# Patient Record
Sex: FEMALE | URBAN - NONMETROPOLITAN AREA
[De-identification: names, ages, dates, MRNs, and addresses within clinical notes are randomized per-mention and may not be internally consistent; named-entity substitution may affect disease eponyms.]

---

## 2017-12-20 ENCOUNTER — NEW PATIENT (OUTPATIENT)
Dept: URBAN - NONMETROPOLITAN AREA CLINIC 7 | Facility: CLINIC | Age: 57
End: 2017-12-20
Payer: COMMERCIAL

## 2017-12-20 DIAGNOSIS — H35.412 LATTICE DEGENERATION OF RETINA, LEFT EYE: ICD-10-CM

## 2017-12-20 PROCEDURE — 99204 OFFICE O/P NEW MOD 45 MIN: CPT | Performed by: OPTOMETRIST

## 2017-12-20 PROCEDURE — 92250 FUNDUS PHOTOGRAPHY W/I&R: CPT | Performed by: OPTOMETRIST

## 2017-12-20 ASSESSMENT — INTRAOCULAR PRESSURE
OS: 19
OD: 18

## 2017-12-20 ASSESSMENT — VISUAL ACUITY
OS: 20/20
OD: 20/20

## 2018-09-06 ENCOUNTER — FOLLOW UP ESTABLISHED (OUTPATIENT)
Dept: URBAN - METROPOLITAN AREA CLINIC 60 | Facility: CLINIC | Age: 58
End: 2018-09-06
Payer: COMMERCIAL

## 2018-09-06 DIAGNOSIS — H25.13 AGE-RELATED NUCLEAR CATARACT, BILATERAL: ICD-10-CM

## 2018-09-06 PROCEDURE — 99213 OFFICE O/P EST LOW 20 MIN: CPT | Performed by: OPTOMETRIST

## 2018-09-06 PROCEDURE — 92133 CPTRZD OPH DX IMG PST SGM ON: CPT | Performed by: OPTOMETRIST

## 2018-09-06 PROCEDURE — 92083 EXTENDED VISUAL FIELD XM: CPT | Performed by: OPTOMETRIST

## 2018-09-06 ASSESSMENT — INTRAOCULAR PRESSURE
OS: 16
OD: 16

## 2018-09-06 ASSESSMENT — VISUAL ACUITY
OS: 20/20
OD: 20/20

## 2019-02-01 ENCOUNTER — FOLLOW UP ESTABLISHED (OUTPATIENT)
Dept: URBAN - NONMETROPOLITAN AREA CLINIC 7 | Facility: CLINIC | Age: 59
End: 2019-02-01
Payer: COMMERCIAL

## 2019-02-01 DIAGNOSIS — H57.052 TONIC PUPIL, LEFT EYE: ICD-10-CM

## 2019-02-01 PROCEDURE — 99213 OFFICE O/P EST LOW 20 MIN: CPT | Performed by: OPTOMETRIST

## 2019-02-01 ASSESSMENT — INTRAOCULAR PRESSURE
OS: 18
OD: 17

## 2019-05-03 ENCOUNTER — FOLLOW UP ESTABLISHED (OUTPATIENT)
Dept: URBAN - NONMETROPOLITAN AREA CLINIC 7 | Facility: CLINIC | Age: 59
End: 2019-05-03
Payer: COMMERCIAL

## 2019-05-03 DIAGNOSIS — H04.123 TEAR FILM INSUFFICIENCY OF BILATERAL LACRIMAL GLANDS: ICD-10-CM

## 2019-05-03 DIAGNOSIS — H40.013 OPEN ANGLE WITH BORDERLINE FINDINGS, LOW RISK, BILATERAL: ICD-10-CM

## 2019-05-03 PROCEDURE — 92250 FUNDUS PHOTOGRAPHY W/I&R: CPT | Performed by: OPTOMETRIST

## 2019-05-03 PROCEDURE — 92014 COMPRE OPH EXAM EST PT 1/>: CPT | Performed by: OPTOMETRIST

## 2019-05-03 ASSESSMENT — VISUAL ACUITY
OS: 20/20
OD: 20/20

## 2019-05-03 ASSESSMENT — INTRAOCULAR PRESSURE
OD: 19
OS: 19

## 2019-12-16 ENCOUNTER — FOLLOW UP ESTABLISHED (OUTPATIENT)
Dept: URBAN - METROPOLITAN AREA CLINIC 60 | Facility: CLINIC | Age: 59
End: 2019-12-16
Payer: COMMERCIAL

## 2019-12-16 DIAGNOSIS — H15.102 UNSPECIFIED EPISCLERITIS, LEFT EYE: Primary | ICD-10-CM

## 2019-12-16 DIAGNOSIS — H11.152 PINGUECULA, LEFT EYE: ICD-10-CM

## 2019-12-16 PROCEDURE — 92012 INTRM OPH EXAM EST PATIENT: CPT | Performed by: OPTOMETRIST

## 2019-12-16 RX ORDER — PREDNISOLONE ACETATE 10 MG/ML
1 % SUSPENSION/ DROPS OPHTHALMIC
Qty: 1 | Refills: 0 | Status: INACTIVE
Start: 2019-12-16 | End: 2020-10-13

## 2020-10-13 ENCOUNTER — FOLLOW UP ESTABLISHED (OUTPATIENT)
Dept: URBAN - METROPOLITAN AREA CLINIC 60 | Facility: CLINIC | Age: 60
End: 2020-10-13
Payer: COMMERCIAL

## 2020-10-13 DIAGNOSIS — H15.101 UNSPECIFIED EPISCLERITIS, RIGHT EYE: Primary | ICD-10-CM

## 2020-10-13 PROCEDURE — 92012 INTRM OPH EXAM EST PATIENT: CPT | Performed by: OPTOMETRIST

## 2020-10-13 RX ORDER — PREDNISOLONE ACETATE 10 MG/ML
1 % SUSPENSION/ DROPS OPHTHALMIC
Qty: 1 | Refills: 0 | Status: ACTIVE
Start: 2020-10-13

## 2020-10-13 ASSESSMENT — INTRAOCULAR PRESSURE
OD: 18
OS: 20

## 2022-04-15 ENCOUNTER — OFFICE VISIT (OUTPATIENT)
Dept: URBAN - METROPOLITAN AREA CLINIC 60 | Facility: CLINIC | Age: 62
End: 2022-04-15
Payer: MEDICARE

## 2022-04-15 DIAGNOSIS — H52.4 PRESBYOPIA: ICD-10-CM

## 2022-04-15 DIAGNOSIS — H40.013 OPEN ANGLE WITH BORDERLINE FINDINGS, LOW RISK, BILATERAL: ICD-10-CM

## 2022-04-15 DIAGNOSIS — H25.13 AGE-RELATED NUCLEAR CATARACT, BILATERAL: Primary | ICD-10-CM

## 2022-04-15 DIAGNOSIS — H15.109 EPISCLERITIS OF EYE: ICD-10-CM

## 2022-04-15 PROCEDURE — 92133 CPTRZD OPH DX IMG PST SGM ON: CPT | Performed by: OPTOMETRIST

## 2022-04-15 PROCEDURE — 92134 CPTRZ OPH DX IMG PST SGM RTA: CPT | Performed by: OPTOMETRIST

## 2022-04-15 PROCEDURE — 99214 OFFICE O/P EST MOD 30 MIN: CPT | Performed by: OPTOMETRIST

## 2022-04-15 PROCEDURE — 76514 ECHO EXAM OF EYE THICKNESS: CPT | Performed by: OPTOMETRIST

## 2022-04-15 RX ORDER — PREDNISOLONE ACETATE 10 MG/ML
1 % SUSPENSION/ DROPS OPHTHALMIC
Qty: 10 | Refills: 0 | Status: ACTIVE
Start: 2022-04-15

## 2022-04-15 NOTE — IMPRESSION/PLAN
Impression: Open angle with borderline findings, low risk, bilateral
*Optic Cupping *no family history Plan: Patient educated. Pachs OCT RNFL and macular GCC done today to monitor status for progression. Results reviewed with the patient. No treatment. Will continue to monitor.

## 2022-04-15 NOTE — IMPRESSION/PLAN
Impression: Episcleritis of eye: H15.109. Bilateral.
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 Recommend patient follow with PCP to rule out any systemic or auto immune disorder.  
Will start patient on Prednisolone QID OU (erxd) x 1 week when symptomatic

## 2023-06-05 ENCOUNTER — OFFICE VISIT (OUTPATIENT)
Dept: URBAN - NONMETROPOLITAN AREA CLINIC 7 | Facility: CLINIC | Age: 63
End: 2023-06-05
Payer: COMMERCIAL

## 2023-06-05 DIAGNOSIS — H35.412 LATTICE DEGENERATION OF RETINA, LEFT EYE: ICD-10-CM

## 2023-06-05 DIAGNOSIS — H40.013 OPEN ANGLE WITH BORDERLINE FINDINGS, LOW RISK, BILATERAL: ICD-10-CM

## 2023-06-05 DIAGNOSIS — H25.13 AGE-RELATED NUCLEAR CATARACT, BILATERAL: ICD-10-CM

## 2023-06-05 DIAGNOSIS — H57.052 TONIC PUPIL, LEFT EYE: ICD-10-CM

## 2023-06-05 DIAGNOSIS — H16.223 KERATOCONJUNCTIVITIS SICCA, BILATERAL: Primary | ICD-10-CM

## 2023-06-05 PROCEDURE — 92014 COMPRE OPH EXAM EST PT 1/>: CPT | Performed by: OPTOMETRIST

## 2023-06-05 PROCEDURE — 92250 FUNDUS PHOTOGRAPHY W/I&R: CPT | Performed by: OPTOMETRIST

## 2023-06-05 ASSESSMENT — INTRAOCULAR PRESSURE
OD: 19
OS: 19

## 2023-06-05 NOTE — IMPRESSION/PLAN
Impression: Keratoconjunctivitis sicca, bilateral: M21.563. Plan: Continue use of artificial tears QID.

## 2023-06-05 NOTE — IMPRESSION/PLAN
Impression: Lattice degeneration of retina, left eye Plan: Patient education provided regarding symptoms and risks of retinal holes, tears, and detachment. The patient was instructed to contact office immediately should symptoms occur. Continue to monitor.

## 2023-06-05 NOTE — IMPRESSION/PLAN
Impression: Open angle with borderline findings, low risk, bilateral: H40.013. Plan: The C/D ratios appear similar to previous findings. The intra-ocular pressures appear relatively stable today. Patient education. Return to clinic for an annual Tapia visual field, optical coherence tomography and an intra-ocular pressure check. Photos ordered for future comparison for signs of progression.

## 2023-09-07 ENCOUNTER — OFFICE VISIT (OUTPATIENT)
Dept: URBAN - NONMETROPOLITAN AREA CLINIC 7 | Facility: CLINIC | Age: 63
End: 2023-09-07
Payer: COMMERCIAL

## 2023-09-07 DIAGNOSIS — H40.013 OPEN ANGLE WITH BORDERLINE FINDINGS, LOW RISK, BILATERAL: Primary | ICD-10-CM

## 2023-09-07 DIAGNOSIS — H04.123 TEAR FILM INSUFFICIENCY OF BILATERAL LACRIMAL GLANDS: ICD-10-CM

## 2023-09-07 DIAGNOSIS — H25.13 AGE-RELATED NUCLEAR CATARACT, BILATERAL: ICD-10-CM

## 2023-09-07 PROCEDURE — 92133 CPTRZD OPH DX IMG PST SGM ON: CPT | Performed by: OPTOMETRIST

## 2023-09-07 PROCEDURE — 99213 OFFICE O/P EST LOW 20 MIN: CPT | Performed by: OPTOMETRIST

## 2023-09-07 PROCEDURE — 92083 EXTENDED VISUAL FIELD XM: CPT | Performed by: OPTOMETRIST

## 2023-09-07 ASSESSMENT — INTRAOCULAR PRESSURE
OS: 19
OD: 19

## 2024-01-10 ENCOUNTER — OFFICE VISIT (OUTPATIENT)
Dept: URBAN - NONMETROPOLITAN AREA CLINIC 7 | Facility: CLINIC | Age: 64
End: 2024-01-10
Payer: COMMERCIAL

## 2024-01-10 DIAGNOSIS — H35.371 PUCKERING OF MACULA, RIGHT EYE: ICD-10-CM

## 2024-01-10 DIAGNOSIS — H40.013 OPEN ANGLE WITH BORDERLINE FINDINGS, LOW RISK, BILATERAL: Primary | ICD-10-CM

## 2024-01-10 DIAGNOSIS — H04.123 TEAR FILM INSUFFICIENCY OF BILATERAL LACRIMAL GLANDS: ICD-10-CM

## 2024-01-10 PROCEDURE — 92134 CPTRZ OPH DX IMG PST SGM RTA: CPT | Performed by: OPTOMETRIST

## 2024-01-10 PROCEDURE — 99213 OFFICE O/P EST LOW 20 MIN: CPT | Performed by: OPTOMETRIST

## 2024-01-10 ASSESSMENT — INTRAOCULAR PRESSURE
OS: 20
OD: 20

## 2024-02-01 ENCOUNTER — OFFICE VISIT (OUTPATIENT)
Dept: URBAN - NONMETROPOLITAN AREA CLINIC 7 | Facility: CLINIC | Age: 64
End: 2024-02-01
Payer: COMMERCIAL

## 2024-02-01 DIAGNOSIS — H52.4 PRESBYOPIA: Primary | ICD-10-CM

## 2024-02-01 DIAGNOSIS — H25.13 AGE-RELATED NUCLEAR CATARACT, BILATERAL: ICD-10-CM

## 2024-02-01 PROCEDURE — 92014 COMPRE OPH EXAM EST PT 1/>: CPT | Performed by: OPTOMETRIST

## 2024-02-01 ASSESSMENT — VISUAL ACUITY
OD: 20/20
OS: 20/20

## 2024-02-01 ASSESSMENT — INTRAOCULAR PRESSURE
OS: 17
OD: 17

## 2024-05-01 ENCOUNTER — OFFICE VISIT (OUTPATIENT)
Dept: URBAN - NONMETROPOLITAN AREA CLINIC 7 | Facility: CLINIC | Age: 64
End: 2024-05-01
Payer: COMMERCIAL

## 2024-05-01 DIAGNOSIS — H40.013 OPEN ANGLE WITH BORDERLINE FINDINGS, LOW RISK, BILATERAL: ICD-10-CM

## 2024-05-01 DIAGNOSIS — H25.13 AGE-RELATED NUCLEAR CATARACT, BILATERAL: ICD-10-CM

## 2024-05-01 DIAGNOSIS — H35.371 PUCKERING OF MACULA, RIGHT EYE: ICD-10-CM

## 2024-05-01 DIAGNOSIS — H04.123 TEAR FILM INSUFFICIENCY OF BILATERAL LACRIMAL GLANDS: Primary | ICD-10-CM

## 2024-05-01 PROCEDURE — 92250 FUNDUS PHOTOGRAPHY W/I&R: CPT | Performed by: OPTOMETRIST

## 2024-05-01 PROCEDURE — 92014 COMPRE OPH EXAM EST PT 1/>: CPT | Performed by: OPTOMETRIST

## 2024-05-01 ASSESSMENT — INTRAOCULAR PRESSURE
OD: 17
OS: 17

## 2024-10-15 NOTE — IMPRESSION/PLAN
Impression: Tonic pupil, left eye Plan: The findings appear to remain stable. Continue to monitor. Patient education on findings. MF result